# Patient Record
Sex: FEMALE | Race: BLACK OR AFRICAN AMERICAN | NOT HISPANIC OR LATINO
[De-identification: names, ages, dates, MRNs, and addresses within clinical notes are randomized per-mention and may not be internally consistent; named-entity substitution may affect disease eponyms.]

---

## 2022-02-23 ENCOUNTER — APPOINTMENT (OUTPATIENT)
Dept: ENDOCRINOLOGY | Facility: CLINIC | Age: 52
End: 2022-02-23
Payer: COMMERCIAL

## 2022-02-23 ENCOUNTER — NON-APPOINTMENT (OUTPATIENT)
Age: 52
End: 2022-02-23

## 2022-02-23 VITALS
RESPIRATION RATE: 17 BRPM | WEIGHT: 178 LBS | SYSTOLIC BLOOD PRESSURE: 124 MMHG | OXYGEN SATURATION: 98 % | HEART RATE: 91 BPM | TEMPERATURE: 98.2 F | HEIGHT: 66 IN | DIASTOLIC BLOOD PRESSURE: 70 MMHG | BODY MASS INDEX: 28.61 KG/M2

## 2022-02-23 DIAGNOSIS — Z83.3 FAMILY HISTORY OF DIABETES MELLITUS: ICD-10-CM

## 2022-02-23 DIAGNOSIS — Z00.00 ENCOUNTER FOR GENERAL ADULT MEDICAL EXAMINATION W/OUT ABNORMAL FINDINGS: ICD-10-CM

## 2022-02-23 LAB — GLUCOSE BLDC GLUCOMTR-MCNC: 141

## 2022-02-23 PROCEDURE — 99244 OFF/OP CNSLTJ NEW/EST MOD 40: CPT | Mod: 25

## 2022-02-23 PROCEDURE — 82962 GLUCOSE BLOOD TEST: CPT

## 2022-02-23 RX ORDER — SITAGLIPTIN 100 MG/1
100 TABLET, FILM COATED ORAL DAILY
Refills: 0 | Status: DISCONTINUED | COMMUNITY
End: 2022-02-23

## 2022-02-23 RX ORDER — GLIPIZIDE 5 MG/1
5 TABLET ORAL DAILY
Refills: 0 | Status: DISCONTINUED | COMMUNITY
End: 2022-02-23

## 2022-02-23 NOTE — CONSULT LETTER
[Dear  ___] : Dear  [unfilled], [Sincerely,] : Sincerely, [FreeTextEntry1] : Thank you for referring  Ms. ANGELITA ESCOBAR to me for evaluation and treatment. Please, see attached consultation note. As always, if there are specific questions you would like to discuss, please feel free to contact me.\par Thank you for the courtesy of this evaluation.\par  [FreeTextEntry3] : Perla Hester MD, FACE, ECNU\par

## 2022-02-23 NOTE — ASSESSMENT
[Diabetes Foot Care] : diabetes foot care [Long Term Vascular Complications] : long term vascular complications of diabetes [Carbohydrate Consistent Diet] : carbohydrate consistent diet [Importance of Diet and Exercise] : importance of diet and exercise to improve glycemic control, achieve weight loss and improve cardiovascular health [Exercise/Effect on Glucose] : exercise/effect on glucose [Hypoglycemia Management] : hypoglycemia management [Glucagon Use] : glucagon use [Ketone Testing] : ketone testing [Action and use of Insulin] : action and use of short and long-acting insulin [Self Monitoring of Blood Glucose] : self monitoring of blood glucose [Insulin Self-Administration] : insulin self-administration [Injection Technique, Storage, Sharps Disposal] : injection technique, storage, and sharps disposal [Sick-Day Management] : sick-day management [Retinopathy Screening] : Patient was referred to ophthalmology for retinopathy screening [Diabetic Medications] : Risks and benefits of diabetic medications were discussed [FreeTextEntry1] : T2DM, uncontrolled\par Current approaches to diabetes management are discussed with the patient. \par Target ranges for blood sugar, blood pressure and cholesterol reviewed, and risk reduction strategies verified. \par Hypoglycemia precautions reviewed with the patient. \par Suggested extensive diabetes education program, including nutritional and diabetes teaching and evaluation. \par Proper dietary restrictions and exercise routines discussed. \par Glucometer/SMBG and log book charting discussed.\par - will retry Natividad , shell use Tegaderm patch or Flonase \par - d/c Januvia and Glipizide\par - advised  on MDI and trying a small dose of Metformin ER,  but she's reluctant\par - Tresiba 23 un HS\par - Rybelsus 3 mg qd and uptitrate as directed\par - Actos 30 mg qd\par - trial of Jardiance 10 mg. hydration and monitor for UTIs\par - Zocor 5 mg\par - monitor urine microalbumin\par - will check pancreatic reserve with the next blood work\par RTC 3 months, to see Rosalee in between

## 2022-02-23 NOTE — HISTORY OF PRESENT ILLNESS
[FreeTextEntry1] : HISTORY OF PRESENT ILLNESS. \par \par Ms. ESCOBAR was diagnosed with Diabetes Mellitus Type 2 in 2013. Not on any medications until about 4 years ago. Once started on the medications, initially on Farxiga, but had to stop because of the frequent UTI's. She is presently on Tresiba 23 un HS, glipizide 5 mg qd, Januvia 100 mg. She recalls trying metformin, felt fine on it, but did not think it worked for her. She was briefly on Bydureon, but stopped because of the needle phobia at that time.  She reports history of a dyslipidemia, and denies HTN, CAD,  known complications of retinopathy, nephropathy, or neuropathy.\par Blood sugars are checked 2-3 times a day. \par Did not bring log book, but reported are typically as following: fbs- low 100s, ppg- 140's\par She used to have Natividad sensor, but stopped because the allergic reaction to the adhesives.\par Hypoglycemia frequency: occasional post breakfast \par Fingerstick glucose in the office today is 141 mg/dL  hours after eating. 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

## 2022-03-30 ENCOUNTER — APPOINTMENT (OUTPATIENT)
Dept: ENDOCRINOLOGY | Facility: CLINIC | Age: 52
End: 2022-03-30
Payer: COMMERCIAL

## 2022-03-30 ENCOUNTER — TRANSCRIPTION ENCOUNTER (OUTPATIENT)
Age: 52
End: 2022-03-30

## 2022-03-30 VITALS — WEIGHT: 182 LBS | HEIGHT: 66 IN | BODY MASS INDEX: 29.25 KG/M2

## 2022-03-30 PROCEDURE — G0108 DIAB MANAGE TRN  PER INDIV: CPT

## 2022-04-27 ENCOUNTER — APPOINTMENT (OUTPATIENT)
Dept: ENDOCRINOLOGY | Facility: CLINIC | Age: 52
End: 2022-04-27
Payer: COMMERCIAL

## 2022-04-27 VITALS — BODY MASS INDEX: 28.28 KG/M2 | HEIGHT: 66 IN | WEIGHT: 176 LBS

## 2022-04-27 PROCEDURE — G0108 DIAB MANAGE TRN  PER INDIV: CPT

## 2022-06-13 ENCOUNTER — RX RENEWAL (OUTPATIENT)
Age: 52
End: 2022-06-13

## 2022-06-15 ENCOUNTER — APPOINTMENT (OUTPATIENT)
Dept: ENDOCRINOLOGY | Facility: CLINIC | Age: 52
End: 2022-06-15
Payer: COMMERCIAL

## 2022-06-15 VITALS
SYSTOLIC BLOOD PRESSURE: 124 MMHG | HEART RATE: 88 BPM | HEIGHT: 66 IN | WEIGHT: 173 LBS | OXYGEN SATURATION: 98 % | DIASTOLIC BLOOD PRESSURE: 76 MMHG | BODY MASS INDEX: 27.8 KG/M2 | TEMPERATURE: 98.1 F

## 2022-06-15 LAB — GLUCOSE BLDC GLUCOMTR-MCNC: 103

## 2022-06-15 PROCEDURE — 99214 OFFICE O/P EST MOD 30 MIN: CPT | Mod: 25

## 2022-06-15 PROCEDURE — 95251 CONT GLUC MNTR ANALYSIS I&R: CPT

## 2022-06-15 RX ORDER — ORAL SEMAGLUTIDE 7 MG/1
7 TABLET ORAL
Qty: 30 | Refills: 6 | Status: DISCONTINUED | COMMUNITY
Start: 2022-02-23 | End: 2022-06-15

## 2022-06-15 NOTE — HISTORY OF PRESENT ILLNESS
[FreeTextEntry1] : F/u for diabetes management\par \par *** Edi 15, 2022 ***\par \par stopped tresiba  about 2 mos ago\par nausea on Rybelsus. switched to Trulicity 0.75 mg a month ago. tolerates well so far\par also, on actos 30 mg qd. stopped Jardiance a swell b/o concerns of hypoglycemia\par \par wearing Dexcom G6\par Date of download:  6/15/22\par Diabetes Medications and Dosage: as above\par Indication for CGMS: verify a change in the treatment regimen, identify periods of hypoglycemia/ hyperglycemia. \par Modal day report: pattern. \par Pt with HYPO  0% of the time (NONE below 54),  97% in target range\par Hyperglycemia:  3% elevation \par Identified issues: carbohydrate counting\par dates analyzed:6/2/22-6/15/22\par \par \par HISTORY OF PRESENT ILLNESS. \par \par Ms. ESCOBAR was diagnosed with Diabetes Mellitus Type 2 in 2013. Not on any medications until about 4 years ago. Once started on the medications, initially on Farxiga, but had to stop because of the frequent UTI's. She is presently on Tresiba 23 un HS, glipizide 5 mg qd, Januvia 100 mg. She recalls trying metformin, felt fine on it, but did not think it worked for her. She was briefly on Bydureon, but stopped because of the needle phobia at that time.  She reports history of a dyslipidemia, and denies HTN, CAD,  known complications of retinopathy, nephropathy, or neuropathy.\par Blood sugars are checked 2-3 times a day. \par Did not bring log book, but reported are typically as following: fbs- low 100s, ppg- 140's\par She used to have Natividad sensor, but stopped because the allergic reaction to the adhesives.\par Hypoglycemia frequency: occasional post breakfast \par Fingerstick glucose in the office today is 141 mg/dL  hours after eating. 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

## 2022-06-15 NOTE — ASSESSMENT
[Diabetes Foot Care] : diabetes foot care [Long Term Vascular Complications] : long term vascular complications of diabetes [Carbohydrate Consistent Diet] : carbohydrate consistent diet [Importance of Diet and Exercise] : importance of diet and exercise to improve glycemic control, achieve weight loss and improve cardiovascular health [Exercise/Effect on Glucose] : exercise/effect on glucose [Hypoglycemia Management] : hypoglycemia management [Glucagon Use] : glucagon use [Ketone Testing] : ketone testing [Action and use of Insulin] : action and use of short and long-acting insulin [Self Monitoring of Blood Glucose] : self monitoring of blood glucose [Insulin Self-Administration] : insulin self-administration [Injection Technique, Storage, Sharps Disposal] : injection technique, storage, and sharps disposal [Sick-Day Management] : sick-day management [Retinopathy Screening] : Patient was referred to ophthalmology for retinopathy screening [Diabetic Medications] : Risks and benefits of diabetic medications were discussed [FreeTextEntry1] : T2DM, appears better controlled\par will observe off insulin for now\par - labs today\par - Trulicity 0.75 mg qw\par - Actos 30 mg qd\par - if needed , will resume Jardiance 10 mg. hydration and monitor for UTIs\par - Zocor 5 mg\par - monitor urine microalbumin\par - will check pancreatic reserve\par RTC 3 months, to see Rosalee in between

## 2022-06-16 ENCOUNTER — TRANSCRIPTION ENCOUNTER (OUTPATIENT)
Age: 52
End: 2022-06-16

## 2022-06-16 LAB
ALBUMIN SERPL ELPH-MCNC: 4.7 G/DL
ALP BLD-CCNC: 92 U/L
ALT SERPL-CCNC: 23 U/L
ANION GAP SERPL CALC-SCNC: 13 MMOL/L
AST SERPL-CCNC: 23 U/L
BILIRUB SERPL-MCNC: 0.2 MG/DL
BUN SERPL-MCNC: 17 MG/DL
C PEPTIDE SERPL-MCNC: 3.3 NG/ML
CALCIUM SERPL-MCNC: 9.4 MG/DL
CHLORIDE SERPL-SCNC: 100 MMOL/L
CHOLEST SERPL-MCNC: 193 MG/DL
CO2 SERPL-SCNC: 23 MMOL/L
CREAT SERPL-MCNC: 0.83 MG/DL
CREAT SPEC-SCNC: 112 MG/DL
EGFR: 86 ML/MIN/1.73M2
ESTIMATED AVERAGE GLUCOSE: 148 MG/DL
GLUCOSE SERPL-MCNC: 104 MG/DL
HBA1C MFR BLD HPLC: 6.8 %
HDLC SERPL-MCNC: 87 MG/DL
LDLC SERPL CALC-MCNC: 91 MG/DL
MICROALBUMIN 24H UR DL<=1MG/L-MCNC: <1.2 MG/DL
MICROALBUMIN/CREAT 24H UR-RTO: NORMAL MG/G
NONHDLC SERPL-MCNC: 106 MG/DL
POTASSIUM SERPL-SCNC: 4.7 MMOL/L
PROT SERPL-MCNC: 7.6 G/DL
SODIUM SERPL-SCNC: 137 MMOL/L
TRIGL SERPL-MCNC: 72 MG/DL
TSH SERPL-ACNC: 1.4 UIU/ML

## 2022-06-19 LAB — PANC ISLET CELL AB SER QL: NORMAL

## 2022-06-20 LAB — GAD65 AB SER-MCNC: 0 NMOL/L

## 2022-07-05 LAB — ZINC TRANSPORTER 8 AB: <15 U/ML

## 2022-10-18 ENCOUNTER — RX RENEWAL (OUTPATIENT)
Age: 52
End: 2022-10-18

## 2022-11-08 ENCOUNTER — APPOINTMENT (OUTPATIENT)
Dept: ENDOCRINOLOGY | Facility: CLINIC | Age: 52
End: 2022-11-08

## 2022-11-08 VITALS
SYSTOLIC BLOOD PRESSURE: 112 MMHG | DIASTOLIC BLOOD PRESSURE: 74 MMHG | HEART RATE: 91 BPM | OXYGEN SATURATION: 99 % | BODY MASS INDEX: 28.61 KG/M2 | TEMPERATURE: 98.1 F | HEIGHT: 66 IN | WEIGHT: 178 LBS | RESPIRATION RATE: 17 BRPM

## 2022-11-08 LAB — GLUCOSE BLDC GLUCOMTR-MCNC: 93

## 2022-11-08 PROCEDURE — 99214 OFFICE O/P EST MOD 30 MIN: CPT | Mod: 25

## 2022-11-08 PROCEDURE — 82962 GLUCOSE BLOOD TEST: CPT

## 2022-11-08 PROCEDURE — 95251 CONT GLUC MNTR ANALYSIS I&R: CPT

## 2022-11-08 PROCEDURE — 36415 COLL VENOUS BLD VENIPUNCTURE: CPT

## 2022-11-08 RX ORDER — INSULIN DEGLUDEC INJECTION 100 U/ML
100 INJECTION, SOLUTION SUBCUTANEOUS
Qty: 2 | Refills: 3 | Status: DISCONTINUED | COMMUNITY
End: 2022-11-08

## 2022-11-08 RX ORDER — DULAGLUTIDE 0.75 MG/.5ML
0.75 INJECTION, SOLUTION SUBCUTANEOUS
Qty: 6 | Refills: 0 | Status: DISCONTINUED | COMMUNITY
Start: 2022-05-12 | End: 2022-11-08

## 2022-11-08 NOTE — HISTORY OF PRESENT ILLNESS
[FreeTextEntry1] : F/u for diabetes management\par \par *** Nov 08, 2022 ***\par \par taking Actos 30 mg qd, Zocor 5 mg\par stopped Trulicity about a week ago, because of the chronic hives. saw an allergist, but thinks trulciity was contributing\par took prednisone for 7 days- sugar was high during that time. now, is taking hydroxyzine and benadryl prn\par lft's were ok as per pt (checked by allergist)\par hives started in July, but has been on trulicity since 05/22\par \par Date of download:  11/08/2022 \par Diabetes Medications and Dosage: as above\par Indication for CGMS: verify a change in the treatment regimen, identify periods of hypoglycemia/ hyperglycemia. \par Modal day report: pattern. \par Pt with HYPO  0% of the time ( NONE below 54),  89% in target range\par Hyperglycemia:  11% elevation \par Identified issues: carbohydrate counting\par dates analyzed:10/26/2022  - 11/08/2022\par \par \par *** Edi 15, 2022 ***\par \par stopped tresiba  about 2 mos ago\par nausea on Rybelsus. switched to Trulicity 0.75 mg a month ago. tolerates well so far\par also, on actos 30 mg qd. stopped Jardiance a swell b/o concerns of hypoglycemia\par \par wearing Dexcom G6\par Date of download:  6/15/22\par Diabetes Medications and Dosage: as above\par Indication for CGMS: verify a change in the treatment regimen, identify periods of hypoglycemia/ hyperglycemia. \par Modal day report: pattern. \par Pt with HYPO  0% of the time (NONE below 54),  97% in target range\par Hyperglycemia:  3% elevation \par Identified issues: carbohydrate counting\par dates analyzed:6/2/22-6/15/22\par \par \par HISTORY OF PRESENT ILLNESS. \par \par Ms. ESCOBAR was diagnosed with Diabetes Mellitus Type 2 in 2013. Not on any medications until about 4 years ago. Once started on the medications, initially on Farxiga, but had to stop because of the frequent UTI's. She is presently on Tresiba 23 un HS, glipizide 5 mg qd, Januvia 100 mg. She recalls trying metformin, felt fine on it, but did not think it worked for her. She was briefly on Bydureon, but stopped because of the needle phobia at that time.  She reports history of a dyslipidemia, and denies HTN, CAD,  known complications of retinopathy, nephropathy, or neuropathy.\par Blood sugars are checked 2-3 times a day. \par Did not bring log book, but reported are typically as following: fbs- low 100s, ppg- 140's\par She used to have Natividad sensor, but stopped because the allergic reaction to the adhesives.\par Hypoglycemia frequency: occasional post breakfast \par Fingerstick glucose in the office today is 141 mg/dL  hours after eating. 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

## 2022-11-08 NOTE — ASSESSMENT
[Diabetes Foot Care] : diabetes foot care [Long Term Vascular Complications] : long term vascular complications of diabetes [Carbohydrate Consistent Diet] : carbohydrate consistent diet [Importance of Diet and Exercise] : importance of diet and exercise to improve glycemic control, achieve weight loss and improve cardiovascular health [Exercise/Effect on Glucose] : exercise/effect on glucose [Hypoglycemia Management] : hypoglycemia management [Glucagon Use] : glucagon use [Ketone Testing] : ketone testing [Action and use of Insulin] : action and use of short and long-acting insulin [Self Monitoring of Blood Glucose] : self monitoring of blood glucose [Insulin Self-Administration] : insulin self-administration [Injection Technique, Storage, Sharps Disposal] : injection technique, storage, and sharps disposal [Sick-Day Management] : sick-day management [Retinopathy Screening] : Patient was referred to ophthalmology for retinopathy screening [Diabetic Medications] : Risks and benefits of diabetic medications were discussed [FreeTextEntry1] : T2DM, reasonably controlled\par will observe off insulin for now\par - labs today\par - keep off  Trulicity foir now, although not sure if Trulicity contributed\par - Actos 30 mg qd and possibly resume Jardiance 10 mg. hydration and monitor for UTIs. Pt will reach out to us with her FS records\par - hold Zocor for few days and monitor for urticaria\par - monitor urine microalbumin\par RTC 3 months, to see Rosalee in between

## 2022-11-09 ENCOUNTER — TRANSCRIPTION ENCOUNTER (OUTPATIENT)
Age: 52
End: 2022-11-09

## 2022-11-09 LAB
ALBUMIN SERPL ELPH-MCNC: 4.8 G/DL
ALP BLD-CCNC: 87 U/L
ALT SERPL-CCNC: 13 U/L
ANION GAP SERPL CALC-SCNC: 8 MMOL/L
AST SERPL-CCNC: 23 U/L
BASOPHILS # BLD AUTO: 0.03 K/UL
BASOPHILS NFR BLD AUTO: 0.5 %
BILIRUB SERPL-MCNC: <0.2 MG/DL
BUN SERPL-MCNC: 14 MG/DL
CALCIUM SERPL-MCNC: 9.5 MG/DL
CHLORIDE SERPL-SCNC: 103 MMOL/L
CHOLEST SERPL-MCNC: 200 MG/DL
CO2 SERPL-SCNC: 27 MMOL/L
CREAT SERPL-MCNC: 0.72 MG/DL
CREAT SPEC-SCNC: 42 MG/DL
EGFR: 101 ML/MIN/1.73M2
EOSINOPHIL # BLD AUTO: 0.24 K/UL
EOSINOPHIL NFR BLD AUTO: 4.4 %
ESTIMATED AVERAGE GLUCOSE: 148 MG/DL
FOLATE SERPL-MCNC: 9.8 NG/ML
GLUCOSE SERPL-MCNC: 91 MG/DL
HBA1C MFR BLD HPLC: 6.8 %
HCT VFR BLD CALC: 41.8 %
HDLC SERPL-MCNC: 92 MG/DL
HGB BLD-MCNC: 13.2 G/DL
IMM GRANULOCYTES NFR BLD AUTO: 0.4 %
LDLC SERPL CALC-MCNC: 93 MG/DL
LYMPHOCYTES # BLD AUTO: 2.2 K/UL
LYMPHOCYTES NFR BLD AUTO: 40.1 %
MAN DIFF?: NORMAL
MCHC RBC-ENTMCNC: 29 PG
MCHC RBC-ENTMCNC: 31.6 GM/DL
MCV RBC AUTO: 91.9 FL
MICROALBUMIN 24H UR DL<=1MG/L-MCNC: <1.2 MG/DL
MICROALBUMIN/CREAT 24H UR-RTO: NORMAL MG/G
MONOCYTES # BLD AUTO: 0.54 K/UL
MONOCYTES NFR BLD AUTO: 9.8 %
NEUTROPHILS # BLD AUTO: 2.46 K/UL
NEUTROPHILS NFR BLD AUTO: 44.8 %
NONHDLC SERPL-MCNC: 108 MG/DL
PLATELET # BLD AUTO: 205 K/UL
POTASSIUM SERPL-SCNC: 4.9 MMOL/L
PROT SERPL-MCNC: 7.2 G/DL
RBC # BLD: 4.55 M/UL
RBC # FLD: 13.9 %
SODIUM SERPL-SCNC: 138 MMOL/L
T4 FREE SERPL-MCNC: 0.9 NG/DL
TRIGL SERPL-MCNC: 75 MG/DL
TSH SERPL-ACNC: 2.57 UIU/ML
VIT B12 SERPL-MCNC: 925 PG/ML
WBC # FLD AUTO: 5.49 K/UL

## 2022-11-18 LAB — CHRONIC URTICARIA PANEL (CU INDEX): <3.5

## 2023-01-01 ENCOUNTER — RX RENEWAL (OUTPATIENT)
Age: 53
End: 2023-01-01

## 2023-02-13 ENCOUNTER — APPOINTMENT (OUTPATIENT)
Dept: ORTHOPEDIC SURGERY | Facility: CLINIC | Age: 53
End: 2023-02-13
Payer: COMMERCIAL

## 2023-02-22 ENCOUNTER — APPOINTMENT (OUTPATIENT)
Dept: ORTHOPEDIC SURGERY | Facility: CLINIC | Age: 53
End: 2023-02-22
Payer: COMMERCIAL

## 2023-02-22 VITALS — BODY MASS INDEX: 28.93 KG/M2 | WEIGHT: 180 LBS | HEIGHT: 66 IN

## 2023-02-22 DIAGNOSIS — S43.491A OTHER SPRAIN OF RIGHT SHOULDER JOINT, INITIAL ENCOUNTER: ICD-10-CM

## 2023-02-22 DIAGNOSIS — M17.11 UNILATERAL PRIMARY OSTEOARTHRITIS, RIGHT KNEE: ICD-10-CM

## 2023-02-22 DIAGNOSIS — M75.41 IMPINGEMENT SYNDROME OF RIGHT SHOULDER: ICD-10-CM

## 2023-02-22 DIAGNOSIS — S83.411A SPRAIN OF MEDIAL COLLATERAL LIGAMENT OF RIGHT KNEE, INITIAL ENCOUNTER: ICD-10-CM

## 2023-02-22 PROCEDURE — 73030 X-RAY EXAM OF SHOULDER: CPT | Mod: RT

## 2023-02-22 PROCEDURE — 99204 OFFICE O/P NEW MOD 45 MIN: CPT | Mod: 25

## 2023-02-22 PROCEDURE — 99072 ADDL SUPL MATRL&STAF TM PHE: CPT

## 2023-02-22 PROCEDURE — 73564 X-RAY EXAM KNEE 4 OR MORE: CPT | Mod: RT

## 2023-02-22 PROCEDURE — L1833: CPT | Mod: RT

## 2023-02-22 NOTE — DISCUSSION/SUMMARY
[de-identified] : Patient allowed to gently start resuming activities. \par Discussed change to medication prescription and usage. \par Bracing options discussed with the pt\par 02/22/2023 \par \par  RE:  ANGELITA LÓPEZJOVAN \par \par Acct #- [id number] \par \par \par Attention:  Nurse Reviewer /Medical Director\par \par I am writing this letter as a medical necessity for PT program.\par Patient has tried analgesics, non-steroid anti-inflammatory agents, \par hot or cold compresses,injections of corticosteroids, etc)  which in combination or by themselves has not worked.\par Based on my patient's condition, I strongly believe that the PT is medically needed.\par  \par Thank you for your time and consideration.   \par \par 02/22/2023 \par \par RE:  ANGELITA ESCOBAR \par \par Acct #- [id number] \par \par Attention:  Nurse Reviewer /Medical Director\par \par I am writing this letter as a medical necessity for a brace hinged knee\par [Patient has tried analgesics, non-steroid anti-inflammatory agents, \par physical therapy, hot or cold compresses,injections of corticosteroids, etc)  which in combination or by themselves has not worked. The brace is needed for joint stability and to improve function.\par Based on my patient's condition, I strongly believe that the brace is necessary.   \par Thank you for your time and consideration.   \par \par
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
## 2023-02-22 NOTE — PHYSICAL EXAM
[5 ___] : forward flexion 5[unfilled]/5 [NL (0)] : extension 0 degrees [5___] : hamstring 5[unfilled]/5 [TWNoteComboBox6] : internal rotation L2 [de-identified] : external rotation 80 degrees [] : posterior tenderness [Equivocal] : equivocal Jannet [Right] : right knee [All Views] : anteroposterior, lateral, skyline, and anteroposterior standing [There are no fractures, subluxations or dislocations. No significant abnormalities are seen] : There are no fractures, subluxations or dislocations. No significant abnormalities are seen [TWNoteComboBox7] : flexion 130 degrees

## 2023-03-01 ENCOUNTER — APPOINTMENT (OUTPATIENT)
Dept: ENDOCRINOLOGY | Facility: CLINIC | Age: 53
End: 2023-03-01
Payer: COMMERCIAL

## 2023-03-01 VITALS
DIASTOLIC BLOOD PRESSURE: 70 MMHG | RESPIRATION RATE: 16 BRPM | HEART RATE: 74 BPM | WEIGHT: 182 LBS | OXYGEN SATURATION: 99 % | HEIGHT: 66 IN | TEMPERATURE: 98 F | SYSTOLIC BLOOD PRESSURE: 120 MMHG | BODY MASS INDEX: 29.25 KG/M2

## 2023-03-01 LAB — GLUCOSE BLDC GLUCOMTR-MCNC: 106

## 2023-03-01 PROCEDURE — 99214 OFFICE O/P EST MOD 30 MIN: CPT | Mod: 25

## 2023-03-01 PROCEDURE — 82962 GLUCOSE BLOOD TEST: CPT

## 2023-03-01 NOTE — ASSESSMENT
[Diabetes Foot Care] : diabetes foot care [Long Term Vascular Complications] : long term vascular complications of diabetes [Carbohydrate Consistent Diet] : carbohydrate consistent diet [Importance of Diet and Exercise] : importance of diet and exercise to improve glycemic control, achieve weight loss and improve cardiovascular health [Exercise/Effect on Glucose] : exercise/effect on glucose [Hypoglycemia Management] : hypoglycemia management [Glucagon Use] : glucagon use [Ketone Testing] : ketone testing [Action and use of Insulin] : action and use of short and long-acting insulin [Self Monitoring of Blood Glucose] : self monitoring of blood glucose [Insulin Self-Administration] : insulin self-administration [Injection Technique, Storage, Sharps Disposal] : injection technique, storage, and sharps disposal [Sick-Day Management] : sick-day management [Retinopathy Screening] : Patient was referred to ophthalmology for retinopathy screening [Diabetic Medications] : Risks and benefits of diabetic medications were discussed [FreeTextEntry1] : T2DM, suboptimally controlled\par patient does not want resume insulin at present\par - labs today\par - keep off  Trulicity for now, although not sure if Trulicity contributed\par - she agreed to try Ozempic 0.25 mg qw and uptitrate if able\par - Actos 30 mg qd,  Jardiance 10 mg. hydration and monitor for UTIs.\par - hold Zocor for few days and monitor for urticaria\par - monitor urine microalbumin\par RTC 3 months, to see Rosalee in between

## 2023-03-01 NOTE — HISTORY OF PRESENT ILLNESS
[FreeTextEntry1] : F/u for diabetes management\par \par *** Mar 01, 2023 ***\par \par still with chronic hives, but much less\par states that did try trulicity once again and her itching was worse\par staying on Jardiance 10 mg , actos 30 mg ,  Zocor 5 mg (resumed back b/o no change in rash with stopping )\par reports fbs- 150's- 160's\par \par *** Nov 08, 2022 ***\par \par taking Actos 30 mg qd, Zocor 5 mg\par stopped Trulicity about a week ago, because of the chronic hives. saw an allergist, but thinks trulciity was contributing\par took prednisone for 7 days- sugar was high during that time. now, is taking hydroxyzine and benadryl prn\par lft's were ok as per pt (checked by allergist)\par hives started in July, but has been on trulicity since 05/22\par \par Date of download:  11/08/2022 \par Diabetes Medications and Dosage: as above\par Indication for CGMS: verify a change in the treatment regimen, identify periods of hypoglycemia/ hyperglycemia. \par Modal day report: pattern. \par Pt with HYPO  0% of the time ( NONE below 54),  89% in target range\par Hyperglycemia:  11% elevation \par Identified issues: carbohydrate counting\par dates analyzed:10/26/2022  - 11/08/2022\par \par \par *** Edi 15, 2022 ***\par \par stopped tresiba  about 2 mos ago\par nausea on Rybelsus. switched to Trulicity 0.75 mg a month ago. tolerates well so far\par also, on actos 30 mg qd. stopped Jardiance a swell b/o concerns of hypoglycemia\par \par wearing Dexcom G6\par Date of download:  6/15/22\par Diabetes Medications and Dosage: as above\par Indication for CGMS: verify a change in the treatment regimen, identify periods of hypoglycemia/ hyperglycemia. \par Modal day report: pattern. \par Pt with HYPO  0% of the time (NONE below 54),  97% in target range\par Hyperglycemia:  3% elevation \par Identified issues: carbohydrate counting\par dates analyzed:6/2/22-6/15/22\par \par \par HISTORY OF PRESENT ILLNESS. \par \par Ms. ESCOBAR was diagnosed with Diabetes Mellitus Type 2 in 2013. Not on any medications until about 4 years ago. Once started on the medications, initially on Farxiga, but had to stop because of the frequent UTI's. She is presently on Tresiba 23 un HS, glipizide 5 mg qd, Januvia 100 mg. She recalls trying metformin, felt fine on it, but did not think it worked for her. She was briefly on Bydureon, but stopped because of the needle phobia at that time.  She reports history of a dyslipidemia, and denies HTN, CAD,  known complications of retinopathy, nephropathy, or neuropathy.\par Blood sugars are checked 2-3 times a day. \par Did not bring log book, but reported are typically as following: fbs- low 100s, ppg- 140's\par She used to have Natividad sensor, but stopped because the allergic reaction to the adhesives.\par Hypoglycemia frequency: occasional post breakfast \par Fingerstick glucose in the office today is 141 mg/dL  hours after eating. 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

## 2023-03-02 ENCOUNTER — NON-APPOINTMENT (OUTPATIENT)
Age: 53
End: 2023-03-02

## 2023-03-02 LAB
ALBUMIN SERPL ELPH-MCNC: 4.5 G/DL
ALP BLD-CCNC: 87 U/L
ALT SERPL-CCNC: 13 U/L
ANION GAP SERPL CALC-SCNC: 13 MMOL/L
AST SERPL-CCNC: 19 U/L
BILIRUB SERPL-MCNC: 0.3 MG/DL
BUN SERPL-MCNC: 13 MG/DL
CALCIUM SERPL-MCNC: 9.2 MG/DL
CHLORIDE SERPL-SCNC: 102 MMOL/L
CHOLEST SERPL-MCNC: 199 MG/DL
CO2 SERPL-SCNC: 22 MMOL/L
CREAT SERPL-MCNC: 0.82 MG/DL
EGFR: 87 ML/MIN/1.73M2
ESTIMATED AVERAGE GLUCOSE: 192 MG/DL
FRUCTOSAMINE SERPL-MCNC: 355 UMOL/L
GLUCOSE SERPL-MCNC: 106 MG/DL
HBA1C MFR BLD HPLC: 8.3 %
HDLC SERPL-MCNC: 94 MG/DL
LDLC SERPL CALC-MCNC: 90 MG/DL
NONHDLC SERPL-MCNC: 105 MG/DL
POTASSIUM SERPL-SCNC: 4.5 MMOL/L
PROT SERPL-MCNC: 7.2 G/DL
SODIUM SERPL-SCNC: 137 MMOL/L
T4 FREE SERPL-MCNC: 0.8 NG/DL
TRIGL SERPL-MCNC: 76 MG/DL
TSH SERPL-ACNC: 1.16 UIU/ML

## 2023-03-05 LAB
CREAT SPEC-SCNC: 92 MG/DL
MICROALBUMIN 24H UR DL<=1MG/L-MCNC: <1.2 MG/DL
MICROALBUMIN/CREAT 24H UR-RTO: NORMAL MG/G

## 2023-03-10 ENCOUNTER — RX RENEWAL (OUTPATIENT)
Age: 53
End: 2023-03-10

## 2023-03-22 ENCOUNTER — NON-APPOINTMENT (OUTPATIENT)
Age: 53
End: 2023-03-22

## 2023-04-25 ENCOUNTER — RX RENEWAL (OUTPATIENT)
Age: 53
End: 2023-04-25

## 2023-04-25 RX ORDER — MELOXICAM 15 MG/1
15 TABLET ORAL
Qty: 30 | Refills: 0 | Status: ACTIVE | COMMUNITY
Start: 2023-02-22 | End: 1900-01-01

## 2023-05-31 ENCOUNTER — RX RENEWAL (OUTPATIENT)
Age: 53
End: 2023-05-31

## 2023-06-01 RX ORDER — BLOOD SUGAR DIAGNOSTIC
STRIP MISCELLANEOUS DAILY
Qty: 1 | Refills: 0 | Status: ACTIVE | COMMUNITY
Start: 2023-06-01 | End: 1900-01-01

## 2023-06-07 ENCOUNTER — APPOINTMENT (OUTPATIENT)
Dept: ENDOCRINOLOGY | Facility: CLINIC | Age: 53
End: 2023-06-07

## 2023-07-12 ENCOUNTER — APPOINTMENT (OUTPATIENT)
Dept: ENDOCRINOLOGY | Facility: CLINIC | Age: 53
End: 2023-07-12
Payer: COMMERCIAL

## 2023-07-12 VITALS
TEMPERATURE: 97.3 F | RESPIRATION RATE: 16 BRPM | HEART RATE: 78 BPM | OXYGEN SATURATION: 99 % | DIASTOLIC BLOOD PRESSURE: 70 MMHG | HEIGHT: 66 IN | SYSTOLIC BLOOD PRESSURE: 120 MMHG | BODY MASS INDEX: 28.45 KG/M2 | WEIGHT: 177 LBS

## 2023-07-12 DIAGNOSIS — E11.65 TYPE 2 DIABETES MELLITUS WITH HYPERGLYCEMIA: ICD-10-CM

## 2023-07-12 LAB — GLUCOSE BLDC GLUCOMTR-MCNC: 78

## 2023-07-12 PROCEDURE — 95251 CONT GLUC MNTR ANALYSIS I&R: CPT

## 2023-07-12 PROCEDURE — 82962 GLUCOSE BLOOD TEST: CPT

## 2023-07-12 PROCEDURE — 36415 COLL VENOUS BLD VENIPUNCTURE: CPT

## 2023-07-12 PROCEDURE — 99214 OFFICE O/P EST MOD 30 MIN: CPT | Mod: 25

## 2023-07-12 NOTE — HISTORY OF PRESENT ILLNESS
[FreeTextEntry1] : F/u for diabetes management\par \par *** Jul 12, 2023 ***\par \par taking Ozempic 0.5 mg qw,  Actos 30 mg qd,  Jardiance 10 mg\par tolerates ozempic well\par hives resolved on its own. back on zocor with no issues\par \par wearing Dexcom\par Date of download:  07/12/2023 \par Diabetes Medications and Dosage: as above\par Indication for CGMS: verify a change in the treatment regimen, identify periods of hypoglycemia/ hyperglycemia. \par Modal day report: pattern. \par Pt with HYPO  <1% of the time ( <1% below 54),  93% in target range\par Hyperglycemia:  6% elevation \par Identified issues: carbohydrate counting\par dates analyzed: 06/28/2023 - 07/12/2023\par \par \par *** Mar 01, 2023 ***\par \par still with chronic hives, but much less\par states that did try trulicity once again and her itching was worse\par staying on Jardiance 10 mg , actos 30 mg ,  Zocor 5 mg (resumed back b/o no change in rash with stopping )\par reports fbs- 150's- 160's\par \par *** Nov 08, 2022 ***\par \par taking Actos 30 mg qd, Zocor 5 mg\par stopped Trulicity about a week ago, because of the chronic hives. saw an allergist, but thinks trulciity was contributing\par took prednisone for 7 days- sugar was high during that time. now, is taking hydroxyzine and benadryl prn\par lft's were ok as per pt (checked by allergist)\par hives started in July, but has been on trulicity since 05/22\par \par Date of download:  11/08/2022 \par Diabetes Medications and Dosage: as above\par Indication for CGMS: verify a change in the treatment regimen, identify periods of hypoglycemia/ hyperglycemia. \par Modal day report: pattern. \par Pt with HYPO  0% of the time ( NONE below 54),  89% in target range\par Hyperglycemia:  11% elevation \par Identified issues: carbohydrate counting\par dates analyzed:10/26/2022  - 11/08/2022\par \par \par *** Edi 15, 2022 ***\par \par stopped tresiba  about 2 mos ago\par nausea on Rybelsus. switched to Trulicity 0.75 mg a month ago. tolerates well so far\par also, on actos 30 mg qd. stopped Jardiance a swell b/o concerns of hypoglycemia\par \par wearing Dexcom G6\par Date of download:  6/15/22\par Diabetes Medications and Dosage: as above\par Indication for CGMS: verify a change in the treatment regimen, identify periods of hypoglycemia/ hyperglycemia. \par Modal day report: pattern. \par Pt with HYPO  0% of the time (NONE below 54),  97% in target range\par Hyperglycemia:  3% elevation \par Identified issues: carbohydrate counting\par dates analyzed:6/2/22-6/15/22\par \par \par HISTORY OF PRESENT ILLNESS. \par \par Ms. ESCOBAR was diagnosed with Diabetes Mellitus Type 2 in 2013. Not on any medications until about 4 years ago. Once started on the medications, initially on Farxiga, but had to stop because of the frequent UTI's. She is presently on Tresiba 23 un HS, glipizide 5 mg qd, Januvia 100 mg. She recalls trying metformin, felt fine on it, but did not think it worked for her. She was briefly on Bydureon, but stopped because of the needle phobia at that time.  She reports history of a dyslipidemia, and denies HTN, CAD,  known complications of retinopathy, nephropathy, or neuropathy.\par Blood sugars are checked 2-3 times a day. \par Did not bring log book, but reported are typically as following: fbs- low 100s, ppg- 140's\par She used to have Natividad sensor, but stopped because the allergic reaction to the adhesives.\par Hypoglycemia frequency: occasional post breakfast \par Fingerstick glucose in the office today is 141 mg/dL  hours after eating. 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

## 2023-07-12 NOTE — ASSESSMENT
[Diabetes Foot Care] : diabetes foot care [Long Term Vascular Complications] : long term vascular complications of diabetes [Carbohydrate Consistent Diet] : carbohydrate consistent diet [Importance of Diet and Exercise] : importance of diet and exercise to improve glycemic control, achieve weight loss and improve cardiovascular health [Exercise/Effect on Glucose] : exercise/effect on glucose [Hypoglycemia Management] : hypoglycemia management [Glucagon Use] : glucagon use [Ketone Testing] : ketone testing [Action and use of Insulin] : action and use of short and long-acting insulin [Self Monitoring of Blood Glucose] : self monitoring of blood glucose [Insulin Self-Administration] : insulin self-administration [Injection Technique, Storage, Sharps Disposal] : injection technique, storage, and sharps disposal [Sick-Day Management] : sick-day management [Retinopathy Screening] : Patient was referred to ophthalmology for retinopathy screening [Diabetic Medications] : Risks and benefits of diabetic medications were discussed [FreeTextEntry1] : T2DM, appears better controlled\par patient does not want resume insulin at present\par - labs today\par - keep off  Trulicity for now, although not sure if Trulicity contributed\par - Ozempic 0.5 mg qw, patient does not want to uptitrate the dose at present\par - Actos 30 mg qd,  Jardiance 10 mg. hydration and monitor for UTIs.\par - cont Zocor and monitor for urticaria\par - monitor urine microalbumin\par RTC 3 months, to see Rosalee in between

## 2023-07-14 LAB
ALBUMIN SERPL ELPH-MCNC: 4.5 G/DL
ALP BLD-CCNC: 85 U/L
ALT SERPL-CCNC: 14 U/L
ANION GAP SERPL CALC-SCNC: 9 MMOL/L
AST SERPL-CCNC: 20 U/L
BILIRUB SERPL-MCNC: 0.3 MG/DL
BUN SERPL-MCNC: 14 MG/DL
CALCIUM SERPL-MCNC: 9.4 MG/DL
CHLORIDE SERPL-SCNC: 103 MMOL/L
CHOLEST SERPL-MCNC: 186 MG/DL
CO2 SERPL-SCNC: 24 MMOL/L
CREAT SERPL-MCNC: 0.7 MG/DL
EGFR: 104 ML/MIN/1.73M2
ESTIMATED AVERAGE GLUCOSE: 148 MG/DL
FOLATE SERPL-MCNC: 17.4 NG/ML
GLUCOSE SERPL-MCNC: 82 MG/DL
HBA1C MFR BLD HPLC: 6.8 %
HDLC SERPL-MCNC: 88 MG/DL
LDLC SERPL CALC-MCNC: 86 MG/DL
NONHDLC SERPL-MCNC: 99 MG/DL
POTASSIUM SERPL-SCNC: 5.1 MMOL/L
PROT SERPL-MCNC: 7.3 G/DL
SODIUM SERPL-SCNC: 137 MMOL/L
TRIGL SERPL-MCNC: 66 MG/DL
TSH SERPL-ACNC: 1.92 UIU/ML
VIT B12 SERPL-MCNC: 1364 PG/ML

## 2023-07-18 ENCOUNTER — RX RENEWAL (OUTPATIENT)
Age: 53
End: 2023-07-18

## 2023-07-18 RX ORDER — SIMVASTATIN 5 MG/1
5 TABLET, FILM COATED ORAL
Qty: 90 | Refills: 3 | Status: ACTIVE | COMMUNITY
Start: 2022-06-13 | End: 1900-01-01

## 2023-10-15 ENCOUNTER — RX RENEWAL (OUTPATIENT)
Age: 53
End: 2023-10-15

## 2023-10-25 ENCOUNTER — APPOINTMENT (OUTPATIENT)
Dept: ENDOCRINOLOGY | Facility: CLINIC | Age: 53
End: 2023-10-25
Payer: COMMERCIAL

## 2023-10-25 VITALS
OXYGEN SATURATION: 98 % | HEART RATE: 86 BPM | BODY MASS INDEX: 25.71 KG/M2 | RESPIRATION RATE: 16 BRPM | HEIGHT: 66 IN | SYSTOLIC BLOOD PRESSURE: 124 MMHG | WEIGHT: 160 LBS | DIASTOLIC BLOOD PRESSURE: 77 MMHG | TEMPERATURE: 98.1 F

## 2023-10-25 LAB — GLUCOSE BLDC GLUCOMTR-MCNC: 96

## 2023-10-25 PROCEDURE — 95251 CONT GLUC MNTR ANALYSIS I&R: CPT

## 2023-10-25 PROCEDURE — 36415 COLL VENOUS BLD VENIPUNCTURE: CPT

## 2023-10-25 PROCEDURE — 99214 OFFICE O/P EST MOD 30 MIN: CPT | Mod: 25

## 2023-10-27 LAB
ALBUMIN SERPL ELPH-MCNC: 4.8 G/DL
ALP BLD-CCNC: 80 U/L
ALT SERPL-CCNC: 18 U/L
ANION GAP SERPL CALC-SCNC: 11 MMOL/L
AST SERPL-CCNC: 26 U/L
BILIRUB SERPL-MCNC: 0.3 MG/DL
BUN SERPL-MCNC: 21 MG/DL
CALCIUM SERPL-MCNC: 9.5 MG/DL
CHLORIDE SERPL-SCNC: 103 MMOL/L
CHOLEST SERPL-MCNC: 197 MG/DL
CO2 SERPL-SCNC: 25 MMOL/L
CREAT SERPL-MCNC: 0.78 MG/DL
EGFR: 91 ML/MIN/1.73M2
ESTIMATED AVERAGE GLUCOSE: 134 MG/DL
GLUCOSE SERPL-MCNC: 76 MG/DL
HBA1C MFR BLD HPLC: 6.3 %
HDLC SERPL-MCNC: 86 MG/DL
LDLC SERPL CALC-MCNC: 95 MG/DL
NONHDLC SERPL-MCNC: 110 MG/DL
POTASSIUM SERPL-SCNC: 6 MMOL/L
PROT SERPL-MCNC: 7.9 G/DL
SODIUM SERPL-SCNC: 139 MMOL/L
TRIGL SERPL-MCNC: 84 MG/DL
TSH SERPL-ACNC: 1.97 UIU/ML

## 2024-01-15 ENCOUNTER — RX RENEWAL (OUTPATIENT)
Age: 54
End: 2024-01-15

## 2024-01-24 ENCOUNTER — APPOINTMENT (OUTPATIENT)
Dept: ENDOCRINOLOGY | Facility: CLINIC | Age: 54
End: 2024-01-24
Payer: COMMERCIAL

## 2024-01-24 VITALS
DIASTOLIC BLOOD PRESSURE: 70 MMHG | BODY MASS INDEX: 25.23 KG/M2 | TEMPERATURE: 97.8 F | OXYGEN SATURATION: 99 % | SYSTOLIC BLOOD PRESSURE: 118 MMHG | WEIGHT: 157 LBS | HEIGHT: 66 IN | RESPIRATION RATE: 16 BRPM | HEART RATE: 70 BPM

## 2024-01-24 LAB — GLUCOSE BLDC GLUCOMTR-MCNC: 83

## 2024-01-24 PROCEDURE — 36415 COLL VENOUS BLD VENIPUNCTURE: CPT | Mod: 59

## 2024-01-24 PROCEDURE — 99214 OFFICE O/P EST MOD 30 MIN: CPT | Mod: 25

## 2024-01-24 PROCEDURE — 82962 GLUCOSE BLOOD TEST: CPT

## 2024-01-24 PROCEDURE — 95251 CONT GLUC MNTR ANALYSIS I&R: CPT

## 2024-01-24 RX ORDER — BLOOD-GLUCOSE SENSOR
EACH MISCELLANEOUS
Qty: 9 | Refills: 0 | Status: DISCONTINUED | COMMUNITY
Start: 2022-04-19 | End: 2024-01-24

## 2024-01-24 RX ORDER — EMPAGLIFLOZIN 10 MG/1
10 TABLET, FILM COATED ORAL DAILY
Qty: 90 | Refills: 2 | Status: ACTIVE | COMMUNITY
Start: 2022-02-23 | End: 1900-01-01

## 2024-01-24 RX ORDER — FLASH GLUCOSE SENSOR
KIT MISCELLANEOUS
Qty: 2 | Refills: 12 | Status: DISCONTINUED | COMMUNITY
Start: 2022-02-23 | End: 2024-01-24

## 2024-01-24 RX ORDER — FLASH GLUCOSE SCANNING READER
EACH MISCELLANEOUS
Qty: 1 | Refills: 0 | Status: DISCONTINUED | COMMUNITY
Start: 2022-03-03 | End: 2024-01-24

## 2024-01-24 RX ORDER — BLOOD-GLUCOSE TRANSMITTER
EACH MISCELLANEOUS
Qty: 1 | Refills: 4 | Status: DISCONTINUED | COMMUNITY
Start: 2022-04-19 | End: 2024-01-24

## 2024-01-24 RX ORDER — BLOOD-GLUCOSE SENSOR
EACH MISCELLANEOUS
Qty: 9 | Refills: 3 | Status: ACTIVE | COMMUNITY
Start: 2023-07-12 | End: 1900-01-01

## 2024-01-24 NOTE — HISTORY OF PRESENT ILLNESS
[FreeTextEntry1] : F/u for diabetes management  *** Jan 24, 2024 ***  feels well, but c/o constipation. tried some OTC prn w/o much relief taking Ozempic 0.5 mg qw,  Actos 30 mg qd,  Jardiance 10 mg  wearing Dexcom Date of download:  01/24/2024  Diabetes Medications and Dosage: as above Indication for CGMS: verify a change in the treatment regimen, identify periods of hypoglycemia/ hyperglycemia.  Modal day report: pattern.  Pt with HYPO  <1% of the time ( <1% below 54),  97% in target range Hyperglycemia:  2% elevation  Identified issues: carbohydrate counting dates analyzed: 01/11/2024 - 01/24/2024  *** Oct 25, 2023 ***  taking Ozempic 0.5 mg qw,  Actos 30 mg qd,  Jardiance 10 mg tolerates ozempic well, lost 17 lbs since the last visit wearing Dexcom G7 Date of download:  10/25/2023  Diabetes Medications and Dosage: as above Indication for CGMS: verify a change in the treatment regimen, identify periods of hypoglycemia/ hyperglycemia.  Modal day report: pattern.  Pt with HYPO  <1% of the time ( <1% below 54),  98% in target range Hyperglycemia:  1% elevation  Identified issues: carbohydrate counting dates analyzed:  10/12/2023- 10/25/2023  *** Jul 12, 2023 ***  taking Ozempic 0.5 mg qw,  Actos 30 mg qd,  Jardiance 10 mg tolerates ozempic well hives resolved on its own. back on zocor with no issues  wearing Dexcom Date of download:  07/12/2023  Diabetes Medications and Dosage: as above Indication for CGMS: verify a change in the treatment regimen, identify periods of hypoglycemia/ hyperglycemia.  Modal day report: pattern.  Pt with HYPO  <1% of the time ( <1% below 54),  93% in target range Hyperglycemia:  6% elevation  Identified issues: carbohydrate counting dates analyzed: 06/28/2023 - 07/12/2023   *** Mar 01, 2023 ***  still with chronic hives, but much less states that did try trulicity once again and her itching was worse staying on Jardiance 10 mg , actos 30 mg ,  Zocor 5 mg (resumed back b/o no change in rash with stopping ) reports fbs- 150's- 160's  *** Nov 08, 2022 ***  taking Actos 30 mg qd, Zocor 5 mg stopped Trulicity about a week ago, because of the chronic hives. saw an allergist, but thinks trulciity was contributing took prednisone for 7 days- sugar was high during that time. now, is taking hydroxyzine and benadryl prn lft's were ok as per pt (checked by allergist) hives started in July, but has been on trulicity since 05/22  Date of download:  11/08/2022  Diabetes Medications and Dosage: as above Indication for CGMS: verify a change in the treatment regimen, identify periods of hypoglycemia/ hyperglycemia.  Modal day report: pattern.  Pt with HYPO  0% of the time ( NONE below 54),  89% in target range Hyperglycemia:  11% elevation  Identified issues: carbohydrate counting dates analyzed:10/26/2022  - 11/08/2022   *** Edi 15, 2022 ***  stopped tresiba  about 2 mos ago nausea on Rybelsus. switched to Trulicity 0.75 mg a month ago. tolerates well so far also, on actos 30 mg qd. stopped Martinez gustafson b/o concerns of hypoglycemia  wearing Dexcom G6 Date of download:  6/15/22 Diabetes Medications and Dosage: as above Indication for CGMS: verify a change in the treatment regimen, identify periods of hypoglycemia/ hyperglycemia.  Modal day report: pattern.  Pt with HYPO  0% of the time (NONE below 54),  97% in target range Hyperglycemia:  3% elevation  Identified issues: carbohydrate counting dates analyzed:6/2/22-6/15/22   HISTORY OF PRESENT ILLNESS.   Ms. ESCOBAR was diagnosed with Diabetes Mellitus Type 2 in 2013. Not on any medications until about 4 years ago. Once started on the medications, initially on Farxiga, but had to stop because of the frequent UTI's. She is presently on Tresiba 23 un HS, glipizide 5 mg qd, Januvia 100 mg. She recalls trying metformin, felt fine on it, but did not think it worked for her. She was briefly on Bydureon, but stopped because of the needle phobia at that time.  She reports history of a dyslipidemia, and denies HTN, CAD,  known complications of retinopathy, nephropathy, or neuropathy. Blood sugars are checked 2-3 times a day.  Did not bring log book, but reported are typically as following: fbs- low 100s, ppg- 140's She used to have Natividad sensor, but stopped because the allergic reaction to the adhesives. Hypoglycemia frequency: occasional post breakfast  Fingerstick glucose in the office today is 141 mg/dL  hours after eating.  Diet: not following ADA Exercise: Walks occasionally. She's a pre-K teacher  Lab review from 2/16/22: a1c- 9.2 , creat- 0.72, LDL- 88  Last dilated eye - 7/23 Last podiatry visit  - 2022 Last cardiology evaluation - none Last stress test - none Last 2-D Echo -none Last nephrology evaluation - none Last neurology evaluation-none

## 2024-01-24 NOTE — ASSESSMENT
Spoke with pt and scheduled her to see Dr. Cameron on Thurs, Nov 1 @ [id number] for a consult (Thyroid Goiter).    [Long Term Vascular Complications] : long term vascular complications of diabetes [Diabetes Foot Care] : diabetes foot care [Carbohydrate Consistent Diet] : carbohydrate consistent diet [Importance of Diet and Exercise] : importance of diet and exercise to improve glycemic control, achieve weight loss and improve cardiovascular health [Exercise/Effect on Glucose] : exercise/effect on glucose [Hypoglycemia Management] : hypoglycemia management [Glucagon Use] : glucagon use [Action and use of Insulin] : action and use of short and long-acting insulin [Ketone Testing] : ketone testing [Self Monitoring of Blood Glucose] : self monitoring of blood glucose [Insulin Self-Administration] : insulin self-administration [Injection Technique, Storage, Sharps Disposal] : injection technique, storage, and sharps disposal [Sick-Day Management] : sick-day management [Retinopathy Screening] : Patient was referred to ophthalmology for retinopathy screening [Diabetic Medications] : Risks and benefits of diabetic medications were discussed [FreeTextEntry1] : T2DM, well controlled patient does not want resume insulin at present - labs today - keep off Trulicity for now, although not sure if Trulicity contributed to her symptoms - Ozempic 0.5 mg qw, patient does not want to switch to Mounjaro - constipation management reviewed in detail - Actos 30 mg qd, Jardiance 10 mg. hydration and monitor for UTIs. - cont Zocor and monitor for urticaria - monitor urine microalbumin RTC 3 months

## 2024-01-25 LAB
ALBUMIN SERPL ELPH-MCNC: 4.4 G/DL
ALP BLD-CCNC: 88 U/L
ALT SERPL-CCNC: 12 U/L
ANION GAP SERPL CALC-SCNC: 13 MMOL/L
AST SERPL-CCNC: 19 U/L
BILIRUB SERPL-MCNC: 0.2 MG/DL
BUN SERPL-MCNC: 18 MG/DL
CALCIUM SERPL-MCNC: 9.8 MG/DL
CHLORIDE SERPL-SCNC: 101 MMOL/L
CHOLEST SERPL-MCNC: 219 MG/DL
CO2 SERPL-SCNC: 24 MMOL/L
CREAT SERPL-MCNC: 0.82 MG/DL
EGFR: 86 ML/MIN/1.73M2
ESTIMATED AVERAGE GLUCOSE: 128 MG/DL
FOLATE SERPL-MCNC: 12 NG/ML
GLUCOSE SERPL-MCNC: 71 MG/DL
HBA1C MFR BLD HPLC: 6.1 %
HDLC SERPL-MCNC: 93 MG/DL
LDLC SERPL CALC-MCNC: 115 MG/DL
NONHDLC SERPL-MCNC: 126 MG/DL
POTASSIUM SERPL-SCNC: 5.4 MMOL/L
POTASSIUM SERPL-SCNC: 5.9 MMOL/L
PROT SERPL-MCNC: 7.5 G/DL
SODIUM SERPL-SCNC: 138 MMOL/L
TRIGL SERPL-MCNC: 67 MG/DL
TSH SERPL-ACNC: 1.67 UIU/ML
VIT B12 SERPL-MCNC: 869 PG/ML

## 2024-02-02 RX ORDER — SEMAGLUTIDE 0.68 MG/ML
2 INJECTION, SOLUTION SUBCUTANEOUS
Qty: 3 | Refills: 2 | Status: ACTIVE | COMMUNITY
Start: 2023-03-01

## 2024-04-12 ENCOUNTER — RX RENEWAL (OUTPATIENT)
Age: 54
End: 2024-04-12

## 2024-04-12 RX ORDER — PIOGLITAZONE HYDROCHLORIDE 30 MG/1
30 TABLET ORAL
Qty: 90 | Refills: 2 | Status: ACTIVE | COMMUNITY
Start: 2022-02-23 | End: 1900-01-01

## 2024-04-30 ENCOUNTER — APPOINTMENT (OUTPATIENT)
Dept: ENDOCRINOLOGY | Facility: CLINIC | Age: 54
End: 2024-04-30
Payer: COMMERCIAL

## 2024-04-30 VITALS
HEIGHT: 66 IN | RESPIRATION RATE: 16 BRPM | SYSTOLIC BLOOD PRESSURE: 131 MMHG | WEIGHT: 156 LBS | HEART RATE: 88 BPM | OXYGEN SATURATION: 97 % | BODY MASS INDEX: 25.07 KG/M2 | DIASTOLIC BLOOD PRESSURE: 84 MMHG | TEMPERATURE: 98 F

## 2024-04-30 DIAGNOSIS — E11.9 TYPE 2 DIABETES MELLITUS W/OUT COMPLICATIONS: ICD-10-CM

## 2024-04-30 DIAGNOSIS — E78.5 HYPERLIPIDEMIA, UNSPECIFIED: ICD-10-CM

## 2024-04-30 LAB — GLUCOSE BLDC GLUCOMTR-MCNC: 73

## 2024-04-30 PROCEDURE — 36415 COLL VENOUS BLD VENIPUNCTURE: CPT

## 2024-04-30 PROCEDURE — 95251 CONT GLUC MNTR ANALYSIS I&R: CPT

## 2024-04-30 PROCEDURE — 82962 GLUCOSE BLOOD TEST: CPT

## 2024-04-30 PROCEDURE — 99214 OFFICE O/P EST MOD 30 MIN: CPT | Mod: 25

## 2024-04-30 NOTE — ASSESSMENT
[Diabetes Foot Care] : diabetes foot care [Long Term Vascular Complications] : long term vascular complications of diabetes [Carbohydrate Consistent Diet] : carbohydrate consistent diet [Importance of Diet and Exercise] : importance of diet and exercise to improve glycemic control, achieve weight loss and improve cardiovascular health [Exercise/Effect on Glucose] : exercise/effect on glucose [Hypoglycemia Management] : hypoglycemia management [Glucagon Use] : glucagon use [Ketone Testing] : ketone testing [Action and use of Insulin] : action and use of short and long-acting insulin [Self Monitoring of Blood Glucose] : self monitoring of blood glucose [Insulin Self-Administration] : insulin self-administration [Injection Technique, Storage, Sharps Disposal] : injection technique, storage, and sharps disposal [Sick-Day Management] : sick-day management [Retinopathy Screening] : Patient was referred to ophthalmology for retinopathy screening [Diabetic Medications] : Risks and benefits of diabetic medications were discussed [FreeTextEntry1] : T2DM, well controlled patient does not want resume insulin at present - labs today - keep off Trulicity for now, although not sure if Trulicity contributed to her symptoms - Ozempic 0.5 mg qw, patient does not want to switch to Mounjaro - constipation management reviewed in detail - Actos 30 mg qd, Jardiance 10 mg. hydration and monitor for UTIs. - cont Zocor and monitor for urticaria - monitor urine microalbumin - monitor for feet pain- is progressively worsen, might need xray to r/o stress fractures. advised to f/u with podiatrist RTC 3 months

## 2024-04-30 NOTE — HISTORY OF PRESENT ILLNESS
[FreeTextEntry1] : F/u for diabetes management  *** Apr 30, 2024 ***  doing well. overall lost ~ 25 lbs taking Ozempic 0.5 mg qw,  Actos 30 mg qd,  Jardiance 10 mg has been wlaking more recently, and lately with feet "soreness", but no pain  wearing Dexcom Date of download:  04/30/2024  Diabetes Medications and Dosage: as above Indication for CGMS: verify a change in the treatment regimen, identify periods of hypoglycemia/ hyperglycemia.  Modal day report: pattern.  Pt with HYPO  <1% of the time ( <1% below 54),  97% in target range Hyperglycemia:  2% elevation  Identified issues: carbohydrate counting dates analyzed: 04/17/2024 - 04/30/2024   *** Jan 24, 2024 ***  feels well, but c/o constipation. tried some OTC prn w/o much relief taking Ozempic 0.5 mg qw,  Actos 30 mg qd,  Jardiance 10 mg  wearing Dexcom Date of download:  01/24/2024  Diabetes Medications and Dosage: as above Indication for CGMS: verify a change in the treatment regimen, identify periods of hypoglycemia/ hyperglycemia.  Modal day report: pattern.  Pt with HYPO  <1% of the time ( <1% below 54),  97% in target range Hyperglycemia:  2% elevation  Identified issues: carbohydrate counting dates analyzed: 01/11/2024 - 01/24/2024  *** Oct 25, 2023 ***  taking Ozempic 0.5 mg qw,  Actos 30 mg qd,  Jardiance 10 mg tolerates ozempic well, lost 17 lbs since the last visit wearing Dexcom G7 Date of download:  10/25/2023  Diabetes Medications and Dosage: as above Indication for CGMS: verify a change in the treatment regimen, identify periods of hypoglycemia/ hyperglycemia.  Modal day report: pattern.  Pt with HYPO  <1% of the time ( <1% below 54),  98% in target range Hyperglycemia:  1% elevation  Identified issues: carbohydrate counting dates analyzed:  10/12/2023- 10/25/2023  *** Jul 12, 2023 ***  taking Ozempic 0.5 mg qw,  Actos 30 mg qd,  Jardiance 10 mg tolerates ozempic well hives resolved on its own. back on zocor with no issues  wearing Dexcom Date of download:  07/12/2023  Diabetes Medications and Dosage: as above Indication for CGMS: verify a change in the treatment regimen, identify periods of hypoglycemia/ hyperglycemia.  Modal day report: pattern.  Pt with HYPO  <1% of the time ( <1% below 54),  93% in target range Hyperglycemia:  6% elevation  Identified issues: carbohydrate counting dates analyzed: 06/28/2023 - 07/12/2023   *** Mar 01, 2023 ***  still with chronic hives, but much less states that did try trulicity once again and her itching was worse staying on Jardiance 10 mg , actos 30 mg ,  Zocor 5 mg (resumed back b/o no change in rash with stopping ) reports fbs- 150's- 160's  *** Nov 08, 2022 ***  taking Actos 30 mg qd, Zocor 5 mg stopped Trulicity about a week ago, because of the chronic hives. saw an allergist, but thinks trulciity was contributing took prednisone for 7 days- sugar was high during that time. now, is taking hydroxyzine and benadryl prn lft's were ok as per pt (checked by allergist) hives started in July, but has been on trulicity since 05/22  Date of download:  11/08/2022  Diabetes Medications and Dosage: as above Indication for CGMS: verify a change in the treatment regimen, identify periods of hypoglycemia/ hyperglycemia.  Modal day report: pattern.  Pt with HYPO  0% of the time ( NONE below 54),  89% in target range Hyperglycemia:  11% elevation  Identified issues: carbohydrate counting dates analyzed:10/26/2022  - 11/08/2022   *** Edi 15, 2022 ***  stopped tresiba  about 2 mos ago nausea on Rybelsus. switched to Trulicity 0.75 mg a month ago. tolerates well so far also, on actos 30 mg qd. stopped Jardiance a swell b/o concerns of hypoglycemia  wearing Dexcom G6 Date of download:  6/15/22 Diabetes Medications and Dosage: as above Indication for CGMS: verify a change in the treatment regimen, identify periods of hypoglycemia/ hyperglycemia.  Modal day report: pattern.  Pt with HYPO  0% of the time (NONE below 54),  97% in target range Hyperglycemia:  3% elevation  Identified issues: carbohydrate counting dates analyzed:6/2/22-6/15/22   HISTORY OF PRESENT ILLNESS.   Ms. ESCOBAR was diagnosed with Diabetes Mellitus Type 2 in 2013. Not on any medications until about 4 years ago. Once started on the medications, initially on Farxiga, but had to stop because of the frequent UTI's. She is presently on Tresiba 23 un HS, glipizide 5 mg qd, Januvia 100 mg. She recalls trying metformin, felt fine on it, but did not think it worked for her. She was briefly on Bydureon, but stopped because of the needle phobia at that time.  She reports history of a dyslipidemia, and denies HTN, CAD,  known complications of retinopathy, nephropathy, or neuropathy. Blood sugars are checked 2-3 times a day.  Did not bring log book, but reported are typically as following: fbs- low 100s, ppg- 140's She used to have Natividad sensor, but stopped because the allergic reaction to the adhesives. Hypoglycemia frequency: occasional post breakfast  Fingerstick glucose in the office today is 141 mg/dL  hours after eating.  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

## 2024-05-01 LAB
25(OH)D3 SERPL-MCNC: 34.7 NG/ML
ALBUMIN SERPL ELPH-MCNC: 4.3 G/DL
ALP BLD-CCNC: 80 U/L
ALT SERPL-CCNC: 31 U/L
ANION GAP SERPL CALC-SCNC: 10 MMOL/L
AST SERPL-CCNC: 29 U/L
BILIRUB SERPL-MCNC: 0.3 MG/DL
BUN SERPL-MCNC: 16 MG/DL
CALCIUM SERPL-MCNC: 9.1 MG/DL
CHLORIDE SERPL-SCNC: 105 MMOL/L
CHOLEST SERPL-MCNC: 197 MG/DL
CO2 SERPL-SCNC: 24 MMOL/L
CREAT SERPL-MCNC: 0.71 MG/DL
CREAT SPEC-SCNC: 142 MG/DL
EGFR: 102 ML/MIN/1.73M2
ESTIMATED AVERAGE GLUCOSE: 123 MG/DL
FOLATE SERPL-MCNC: 16.4 NG/ML
GLUCOSE SERPL-MCNC: 77 MG/DL
HBA1C MFR BLD HPLC: 5.9 %
HDLC SERPL-MCNC: 84 MG/DL
LDLC SERPL CALC-MCNC: 100 MG/DL
MICROALBUMIN 24H UR DL<=1MG/L-MCNC: 1.2 MG/DL
MICROALBUMIN/CREAT 24H UR-RTO: 9 MG/G
NONHDLC SERPL-MCNC: 112 MG/DL
POTASSIUM SERPL-SCNC: 4.7 MMOL/L
PROT SERPL-MCNC: 7 G/DL
SODIUM SERPL-SCNC: 138 MMOL/L
T4 FREE SERPL-MCNC: 1 NG/DL
TRIGL SERPL-MCNC: 64 MG/DL
TSH SERPL-ACNC: 1.23 UIU/ML
VIT B12 SERPL-MCNC: 862 PG/ML

## 2024-08-07 ENCOUNTER — APPOINTMENT (OUTPATIENT)
Dept: ENDOCRINOLOGY | Facility: CLINIC | Age: 54
End: 2024-08-07

## 2024-08-07 PROCEDURE — 95251 CONT GLUC MNTR ANALYSIS I&R: CPT

## 2024-08-07 PROCEDURE — 99214 OFFICE O/P EST MOD 30 MIN: CPT | Mod: 25

## 2024-08-07 PROCEDURE — 36415 COLL VENOUS BLD VENIPUNCTURE: CPT

## 2024-08-07 NOTE — HISTORY OF PRESENT ILLNESS
[FreeTextEntry1] : F/u for diabetes management  *** Aug 07, 2024 ***  staying on Ozempic 0.5 mg qw,  Actos 30 mg qd,  Jardiance 10 mg, Zocor 5 mg HS regained weight, thinks related to her perimenopausal state. otherwise doing well wearing Dexcom Date of download:  08/07/2024  Diabetes Medications and Dosage: as above Indication for CGMS: verify a change in the treatment regimen, identify periods of hypoglycemia/ hyperglycemia.  Modal day report: pattern.  Pt with HYPO  0% of the time ( NONE below 54),  99% in target range Hyperglycemia:  1% elevation  Identified issues: carbohydrate counting dates analyzed:  07/25/2024- 08/07/2024  *** Apr 30, 2024 ***  doing well. overall lost ~ 25 lbs taking Ozempic 0.5 mg qw,  Actos 30 mg qd,  Jardiance 10 mg has been wlaking more recently, and lately with feet "soreness", but no pain  wearing Dexcom Date of download:  04/30/2024  Diabetes Medications and Dosage: as above Indication for CGMS: verify a change in the treatment regimen, identify periods of hypoglycemia/ hyperglycemia.  Modal day report: pattern.  Pt with HYPO  <1% of the time ( <1% below 54),  97% in target range Hyperglycemia:  2% elevation  Identified issues: carbohydrate counting dates analyzed: 04/17/2024 - 04/30/2024   *** Jan 24, 2024 ***  feels well, but c/o constipation. tried some OTC prn w/o much relief taking Ozempic 0.5 mg qw,  Actos 30 mg qd,  Jardiance 10 mg  wearing Dexcom Date of download:  01/24/2024  Diabetes Medications and Dosage: as above Indication for CGMS: verify a change in the treatment regimen, identify periods of hypoglycemia/ hyperglycemia.  Modal day report: pattern.  Pt with HYPO  <1% of the time ( <1% below 54),  97% in target range Hyperglycemia:  2% elevation  Identified issues: carbohydrate counting dates analyzed: 01/11/2024 - 01/24/2024  *** Oct 25, 2023 ***  taking Ozempic 0.5 mg qw,  Actos 30 mg qd,  Jardiance 10 mg tolerates ozempic well, lost 17 lbs since the last visit wearing Dexcom G7 Date of download:  10/25/2023  Diabetes Medications and Dosage: as above Indication for CGMS: verify a change in the treatment regimen, identify periods of hypoglycemia/ hyperglycemia.  Modal day report: pattern.  Pt with HYPO  <1% of the time ( <1% below 54),  98% in target range Hyperglycemia:  1% elevation  Identified issues: carbohydrate counting dates analyzed:  10/12/2023- 10/25/2023  *** Jul 12, 2023 ***  taking Ozempic 0.5 mg qw,  Actos 30 mg qd,  Jardiance 10 mg tolerates ozempic well hives resolved on its own. back on zocor with no issues  wearing Dexcom Date of download:  07/12/2023  Diabetes Medications and Dosage: as above Indication for CGMS: verify a change in the treatment regimen, identify periods of hypoglycemia/ hyperglycemia.  Modal day report: pattern.  Pt with HYPO  <1% of the time ( <1% below 54),  93% in target range Hyperglycemia:  6% elevation  Identified issues: carbohydrate counting dates analyzed: 06/28/2023 - 07/12/2023   *** Mar 01, 2023 ***  still with chronic hives, but much less states that did try trulicity once again and her itching was worse staying on Jardiance 10 mg , actos 30 mg ,  Zocor 5 mg (resumed back b/o no change in rash with stopping ) reports fbs- 150's- 160's  *** Nov 08, 2022 ***  taking Actos 30 mg qd, Zocor 5 mg stopped Trulicity about a week ago, because of the chronic hives. saw an allergist, but thinks trulciity was contributing took prednisone for 7 days- sugar was high during that time. now, is taking hydroxyzine and benadryl prn lft's were ok as per pt (checked by allergist) hives started in July, but has been on trulicity since 05/22  Date of download:  11/08/2022  Diabetes Medications and Dosage: as above Indication for CGMS: verify a change in the treatment regimen, identify periods of hypoglycemia/ hyperglycemia.  Modal day report: pattern.  Pt with HYPO  0% of the time ( NONE below 54),  89% in target range Hyperglycemia:  11% elevation  Identified issues: carbohydrate counting dates analyzed:10/26/2022  - 11/08/2022   *** Edi 15, 2022 ***  stopped tresiba  about 2 mos ago nausea on Rybelsus. switched to Trulicity 0.75 mg a month ago. tolerates well so far also, on actos 30 mg qd. stopped Jardiance a janay b/o concerns of hypoglycemia  wearing Dexcom G6 Date of download:  6/15/22 Diabetes Medications and Dosage: as above Indication for CGMS: verify a change in the treatment regimen, identify periods of hypoglycemia/ hyperglycemia.  Modal day report: pattern.  Pt with HYPO  0% of the time (NONE below 54),  97% in target range Hyperglycemia:  3% elevation  Identified issues: carbohydrate counting dates analyzed:6/2/22-6/15/22   HISTORY OF PRESENT ILLNESS.   Ms. ESCOBAR was diagnosed with Diabetes Mellitus Type 2 in 2013. Not on any medications until about 4 years ago. Once started on the medications, initially on Farxiga, but had to stop because of the frequent UTI's. She is presently on Tresiba 23 un HS, glipizide 5 mg qd, Januvia 100 mg. She recalls trying metformin, felt fine on it, but did not think it worked for her. She was briefly on Bydureon, but stopped because of the needle phobia at that time.  She reports history of a dyslipidemia, and denies HTN, CAD,  known complications of retinopathy, nephropathy, or neuropathy. Blood sugars are checked 2-3 times a day.  Did not bring log book, but reported are typically as following: fbs- low 100s, ppg- 140's She used to have Natividad sensor, but stopped because the allergic reaction to the adhesives. Hypoglycemia frequency: occasional post breakfast  Fingerstick glucose in the office today is 141 mg/dL  hours after eating.  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

## 2024-08-07 NOTE — ASSESSMENT
[Diabetes Foot Care] : diabetes foot care [Long Term Vascular Complications] : long term vascular complications of diabetes [Carbohydrate Consistent Diet] : carbohydrate consistent diet [Importance of Diet and Exercise] : importance of diet and exercise to improve glycemic control, achieve weight loss and improve cardiovascular health [Exercise/Effect on Glucose] : exercise/effect on glucose [Hypoglycemia Management] : hypoglycemia management [Glucagon Use] : glucagon use [Ketone Testing] : ketone testing [Action and use of Insulin] : action and use of short and long-acting insulin [Self Monitoring of Blood Glucose] : self monitoring of blood glucose [Insulin Self-Administration] : insulin self-administration [Injection Technique, Storage, Sharps Disposal] : injection technique, storage, and sharps disposal [Sick-Day Management] : sick-day management [Retinopathy Screening] : Patient was referred to ophthalmology for retinopathy screening [Diabetic Medications] : Risks and benefits of diabetic medications were discussed [FreeTextEntry1] : T2DM, well controlled patient does not want resume insulin at present - labs today - keep off Trulicity for now, although not sure if Trulicity contributed to her symptoms - Ozempic 0.5 mg qw, patient does not want to  uptitrate the dose or switch to Mounjaro yet - constipation management reviewed in detail - Actos 30 mg qd, Jardiance 10 mg. hydration and monitor for UTIs. - cont Zocor and monitor for urticaria - monitor urine microalbumin - monitor for feet pain- is progressively worsen, might need xray to r/o stress fractures. advised to f/u with podiatrist RTC 3 months

## 2024-10-25 ENCOUNTER — RX RENEWAL (OUTPATIENT)
Age: 54
End: 2024-10-25

## 2024-12-12 ENCOUNTER — APPOINTMENT (OUTPATIENT)
Dept: ENDOCRINOLOGY | Facility: CLINIC | Age: 54
End: 2024-12-12
Payer: COMMERCIAL

## 2024-12-12 VITALS
SYSTOLIC BLOOD PRESSURE: 126 MMHG | BODY MASS INDEX: 27.32 KG/M2 | DIASTOLIC BLOOD PRESSURE: 79 MMHG | TEMPERATURE: 97.8 F | HEIGHT: 66 IN | HEART RATE: 87 BPM | WEIGHT: 170 LBS | OXYGEN SATURATION: 99 % | RESPIRATION RATE: 16 BRPM

## 2024-12-12 DIAGNOSIS — E11.9 TYPE 2 DIABETES MELLITUS W/OUT COMPLICATIONS: ICD-10-CM

## 2024-12-12 DIAGNOSIS — E78.5 HYPERLIPIDEMIA, UNSPECIFIED: ICD-10-CM

## 2024-12-12 LAB — GLUCOSE BLDC GLUCOMTR-MCNC: 96

## 2024-12-12 PROCEDURE — 82962 GLUCOSE BLOOD TEST: CPT

## 2024-12-12 PROCEDURE — 36415 COLL VENOUS BLD VENIPUNCTURE: CPT | Mod: 59

## 2024-12-12 PROCEDURE — 95251 CONT GLUC MNTR ANALYSIS I&R: CPT

## 2024-12-12 PROCEDURE — 99214 OFFICE O/P EST MOD 30 MIN: CPT

## 2024-12-16 LAB
ALBUMIN SERPL ELPH-MCNC: 4.5 G/DL
ALP BLD-CCNC: 102 U/L
ALT SERPL-CCNC: 19 U/L
ANION GAP SERPL CALC-SCNC: 12 MMOL/L
AST SERPL-CCNC: 23 U/L
BILIRUB SERPL-MCNC: 0.2 MG/DL
BUN SERPL-MCNC: 16 MG/DL
CALCIUM SERPL-MCNC: 9.7 MG/DL
CHLORIDE SERPL-SCNC: 103 MMOL/L
CHOLEST SERPL-MCNC: 186 MG/DL
CO2 SERPL-SCNC: 24 MMOL/L
CREAT SERPL-MCNC: 0.67 MG/DL
CREAT SPEC-SCNC: 157 MG/DL
EGFR: 104 ML/MIN/1.73M2
ESTIMATED AVERAGE GLUCOSE: 131 MG/DL
FOLATE SERPL-MCNC: 17.3 NG/ML
GLUCOSE SERPL-MCNC: 95 MG/DL
HBA1C MFR BLD HPLC: 6.2 %
HDLC SERPL-MCNC: 91 MG/DL
LDLC SERPL CALC-MCNC: 86 MG/DL
MICROALBUMIN 24H UR DL<=1MG/L-MCNC: 2 MG/DL
MICROALBUMIN/CREAT 24H UR-RTO: 12 MG/G
NONHDLC SERPL-MCNC: 95 MG/DL
POTASSIUM SERPL-SCNC: 4.8 MMOL/L
PROT SERPL-MCNC: 7.3 G/DL
SODIUM SERPL-SCNC: 139 MMOL/L
T4 FREE SERPL-MCNC: 0.9 NG/DL
TRIGL SERPL-MCNC: 44 MG/DL
TSH SERPL-ACNC: 1.62 UIU/ML
VIT B12 SERPL-MCNC: 965 PG/ML

## 2025-01-21 ENCOUNTER — RX RENEWAL (OUTPATIENT)
Age: 55
End: 2025-01-21

## 2025-01-29 ENCOUNTER — NON-APPOINTMENT (OUTPATIENT)
Age: 55
End: 2025-01-29

## 2025-01-29 ENCOUNTER — APPOINTMENT (OUTPATIENT)
Dept: INTERNAL MEDICINE | Facility: CLINIC | Age: 55
End: 2025-01-29
Payer: COMMERCIAL

## 2025-01-29 VITALS
SYSTOLIC BLOOD PRESSURE: 119 MMHG | BODY MASS INDEX: 27.64 KG/M2 | HEART RATE: 90 BPM | HEIGHT: 66 IN | DIASTOLIC BLOOD PRESSURE: 78 MMHG | OXYGEN SATURATION: 97 % | TEMPERATURE: 97.3 F | WEIGHT: 172 LBS

## 2025-01-29 DIAGNOSIS — Z00.00 ENCOUNTER FOR GENERAL ADULT MEDICAL EXAMINATION W/OUT ABNORMAL FINDINGS: ICD-10-CM

## 2025-01-29 DIAGNOSIS — Z13.6 ENCOUNTER FOR SCREENING FOR CARDIOVASCULAR DISORDERS: ICD-10-CM

## 2025-01-29 DIAGNOSIS — E78.5 HYPERLIPIDEMIA, UNSPECIFIED: ICD-10-CM

## 2025-01-29 DIAGNOSIS — Z82.49 FAMILY HISTORY OF ISCHEMIC HEART DISEASE AND OTHER DISEASES OF THE CIRCULATORY SYSTEM: ICD-10-CM

## 2025-01-29 DIAGNOSIS — E11.9 TYPE 2 DIABETES MELLITUS W/OUT COMPLICATIONS: ICD-10-CM

## 2025-01-29 PROCEDURE — 93000 ELECTROCARDIOGRAM COMPLETE: CPT | Mod: 59

## 2025-01-29 PROCEDURE — 99386 PREV VISIT NEW AGE 40-64: CPT | Mod: 25

## 2025-03-05 ENCOUNTER — APPOINTMENT (OUTPATIENT)
Dept: ENDOCRINOLOGY | Facility: CLINIC | Age: 55
End: 2025-03-05
Payer: COMMERCIAL

## 2025-03-05 VITALS
DIASTOLIC BLOOD PRESSURE: 79 MMHG | HEIGHT: 66 IN | RESPIRATION RATE: 16 BRPM | SYSTOLIC BLOOD PRESSURE: 115 MMHG | WEIGHT: 179 LBS | HEART RATE: 81 BPM | BODY MASS INDEX: 28.77 KG/M2 | OXYGEN SATURATION: 100 %

## 2025-03-05 DIAGNOSIS — E11.9 TYPE 2 DIABETES MELLITUS W/OUT COMPLICATIONS: ICD-10-CM

## 2025-03-05 DIAGNOSIS — E78.5 HYPERLIPIDEMIA, UNSPECIFIED: ICD-10-CM

## 2025-03-05 LAB
GLUCOSE BLDC GLUCOMTR-MCNC: 92
HBA1C MFR BLD HPLC: 5.7

## 2025-03-05 PROCEDURE — 95251 CONT GLUC MNTR ANALYSIS I&R: CPT

## 2025-03-05 PROCEDURE — 82962 GLUCOSE BLOOD TEST: CPT

## 2025-03-05 PROCEDURE — 99214 OFFICE O/P EST MOD 30 MIN: CPT

## 2025-03-05 PROCEDURE — 83036 HEMOGLOBIN GLYCOSYLATED A1C: CPT | Mod: QW

## 2025-03-26 ENCOUNTER — RX RENEWAL (OUTPATIENT)
Age: 55
End: 2025-03-26

## 2025-06-11 ENCOUNTER — APPOINTMENT (OUTPATIENT)
Dept: ENDOCRINOLOGY | Facility: CLINIC | Age: 55
End: 2025-06-11

## 2025-08-12 ENCOUNTER — NON-APPOINTMENT (OUTPATIENT)
Age: 55
End: 2025-08-12

## 2025-08-13 ENCOUNTER — APPOINTMENT (OUTPATIENT)
Dept: ORTHOPEDIC SURGERY | Facility: CLINIC | Age: 55
End: 2025-08-13
Payer: COMMERCIAL

## 2025-08-13 ENCOUNTER — APPOINTMENT (OUTPATIENT)
Dept: ENDOCRINOLOGY | Facility: CLINIC | Age: 55
End: 2025-08-13
Payer: COMMERCIAL

## 2025-08-13 VITALS
HEIGHT: 66 IN | DIASTOLIC BLOOD PRESSURE: 71 MMHG | SYSTOLIC BLOOD PRESSURE: 120 MMHG | HEART RATE: 88 BPM | RESPIRATION RATE: 16 BRPM | OXYGEN SATURATION: 98 %

## 2025-08-13 VITALS — HEIGHT: 66 IN | WEIGHT: 175 LBS | BODY MASS INDEX: 28.12 KG/M2

## 2025-08-13 DIAGNOSIS — E78.5 HYPERLIPIDEMIA, UNSPECIFIED: ICD-10-CM

## 2025-08-13 DIAGNOSIS — S62.109A FRACTURE OF UNSPECIFIED CARPAL BONE, UNSPECIFIED WRIST, INITIAL ENCOUNTER FOR CLOSED FRACTURE: ICD-10-CM

## 2025-08-13 DIAGNOSIS — E11.9 TYPE 2 DIABETES MELLITUS W/OUT COMPLICATIONS: ICD-10-CM

## 2025-08-13 LAB
GLUCOSE BLDC GLUCOMTR-MCNC: 137
HBA1C MFR BLD HPLC: 5.9

## 2025-08-13 PROCEDURE — 82962 GLUCOSE BLOOD TEST: CPT

## 2025-08-13 PROCEDURE — 73110 X-RAY EXAM OF WRIST: CPT | Mod: RT

## 2025-08-13 PROCEDURE — 83036 HEMOGLOBIN GLYCOSYLATED A1C: CPT | Mod: QW

## 2025-08-13 PROCEDURE — 36415 COLL VENOUS BLD VENIPUNCTURE: CPT

## 2025-08-13 PROCEDURE — 99203 OFFICE O/P NEW LOW 30 MIN: CPT

## 2025-08-13 PROCEDURE — 99214 OFFICE O/P EST MOD 30 MIN: CPT | Mod: 25

## 2025-08-15 ENCOUNTER — TRANSCRIPTION ENCOUNTER (OUTPATIENT)
Age: 55
End: 2025-08-15

## 2025-08-15 LAB
25(OH)D3 SERPL-MCNC: 31.9 NG/ML
ALBUMIN SERPL ELPH-MCNC: 4.2 G/DL
ALBUMIN, RANDOM URINE: <1.2 MG/DL
ALP BLD-CCNC: 98 U/L
ALT SERPL-CCNC: 16 U/L
ANION GAP SERPL CALC-SCNC: 14 MMOL/L
AST SERPL-CCNC: 20 U/L
BILIRUB SERPL-MCNC: 0.2 MG/DL
BUN SERPL-MCNC: 17 MG/DL
CALCIUM SERPL-MCNC: 9.2 MG/DL
CHLORIDE SERPL-SCNC: 102 MMOL/L
CHOLEST SERPL-MCNC: 213 MG/DL
CO2 SERPL-SCNC: 24 MMOL/L
CREAT SERPL-MCNC: 0.92 MG/DL
CREAT SPEC-SCNC: 240 MG/DL
EGFRCR SERPLBLD CKD-EPI 2021: 74 ML/MIN/1.73M2
ESTIMATED AVERAGE GLUCOSE: 131 MG/DL
FOLATE SERPL-MCNC: 14.2 NG/ML
GLUCOSE SERPL-MCNC: 104 MG/DL
HBA1C MFR BLD HPLC: 6.2 %
HDLC SERPL-MCNC: 87 MG/DL
LDLC SERPL-MCNC: 114 MG/DL
MICROALBUMIN/CREAT 24H UR-RTO: NORMAL MG/G
NONHDLC SERPL-MCNC: 126 MG/DL
POTASSIUM SERPL-SCNC: 4.2 MMOL/L
PROT SERPL-MCNC: 7 G/DL
SODIUM SERPL-SCNC: 140 MMOL/L
TRIGL SERPL-MCNC: 68 MG/DL
TSH SERPL-ACNC: 1.31 UIU/ML
VIT B12 SERPL-MCNC: 821 PG/ML

## 2025-08-21 ENCOUNTER — APPOINTMENT (OUTPATIENT)
Dept: ORTHOPEDIC SURGERY | Facility: CLINIC | Age: 55
End: 2025-08-21
Payer: COMMERCIAL

## 2025-08-21 VITALS — HEIGHT: 66 IN | BODY MASS INDEX: 28.12 KG/M2 | WEIGHT: 175 LBS

## 2025-08-21 DIAGNOSIS — S30.0XXA CONTUSION OF LOWER BACK AND PELVIS, INITIAL ENCOUNTER: ICD-10-CM

## 2025-08-21 DIAGNOSIS — M51.9 UNSPECIFIED THORACIC, THORACOLUMBAR AND LUMBOSACRAL INTERVERTEBRAL DISC DISORDER: ICD-10-CM

## 2025-08-21 PROCEDURE — 72100 X-RAY EXAM L-S SPINE 2/3 VWS: CPT

## 2025-08-21 PROCEDURE — 72170 X-RAY EXAM OF PELVIS: CPT

## 2025-08-21 PROCEDURE — 99213 OFFICE O/P EST LOW 20 MIN: CPT

## 2025-08-27 ENCOUNTER — APPOINTMENT (OUTPATIENT)
Dept: ORTHOPEDIC SURGERY | Facility: CLINIC | Age: 55
End: 2025-08-27

## 2025-08-27 VITALS — WEIGHT: 175 LBS | BODY MASS INDEX: 28.12 KG/M2 | HEIGHT: 66 IN

## 2025-08-27 DIAGNOSIS — S52.501A UNSPECIFIED FRACTURE OF THE LOWER END OF RIGHT RADIUS, INITIAL ENCOUNTER FOR CLOSED FRACTURE: ICD-10-CM

## 2025-08-27 PROCEDURE — 99204 OFFICE O/P NEW MOD 45 MIN: CPT

## 2025-08-27 PROCEDURE — 73110 X-RAY EXAM OF WRIST: CPT | Mod: RT

## 2025-08-27 RX ORDER — MELOXICAM 15 MG/1
15 TABLET ORAL
Qty: 30 | Refills: 0 | Status: ACTIVE | COMMUNITY
Start: 2025-08-27 | End: 1900-01-01

## 2025-09-09 RX ORDER — BLOOD SUGAR DIAGNOSTIC
STRIP MISCELLANEOUS DAILY
Qty: 1 | Refills: 1 | Status: ACTIVE | COMMUNITY
Start: 2025-09-09 | End: 1900-01-01

## 2025-09-17 ENCOUNTER — APPOINTMENT (OUTPATIENT)
Dept: ORTHOPEDIC SURGERY | Facility: CLINIC | Age: 55
End: 2025-09-17
Payer: COMMERCIAL

## 2025-09-17 ENCOUNTER — RESULT CHARGE (OUTPATIENT)
Age: 55
End: 2025-09-17

## 2025-09-17 DIAGNOSIS — S52.501A UNSPECIFIED FRACTURE OF THE LOWER END OF RIGHT RADIUS, INITIAL ENCOUNTER FOR CLOSED FRACTURE: ICD-10-CM

## 2025-09-17 PROCEDURE — 99204 OFFICE O/P NEW MOD 45 MIN: CPT

## 2025-09-17 PROCEDURE — 73110 X-RAY EXAM OF WRIST: CPT | Mod: RT
